# Patient Record
Sex: FEMALE | Race: WHITE
[De-identification: names, ages, dates, MRNs, and addresses within clinical notes are randomized per-mention and may not be internally consistent; named-entity substitution may affect disease eponyms.]

---

## 2021-12-07 ENCOUNTER — NURSE TRIAGE (OUTPATIENT)
Dept: OTHER | Facility: CLINIC | Age: 46
End: 2021-12-07

## 2021-12-07 NOTE — TELEPHONE ENCOUNTER
Patient called [facility] with red flag complaint to establish care with [facility] [place] [facility] [place]. Brief description of triage: Patient calls with complaints of lightheaded/dizziness when standing or changing position that started this morning. Triage indicates for patient to be seen in the office today. Advised caller if unable to get an appointment in the suggested time frame to go to an [facility] or walk-in clinic, caller agreeable. Care advice provided, patient verbalizes understanding; denies any other questions or concerns; instructed to call back for any new or worsening symptoms. Writer provided warm transfer to April at [facility] for appointment scheduling. Attention Provider: Thank you for allowing me to participate in the care of your patient. The patient was connected to triage in response to information provided to the [facility]. Please do not respond through this encounter as the response is not directed to a shared pool. Reason for Disposition   Vomiting occurs with dizziness    Answer Assessment - Initial Assessment Questions  1. DESCRIPTION: \"Describe your dizziness. \"      This morning got up and got dizzy/lightheaded and felt sick, vomited 2x     2. LIGHTHEADED: \"Do you feel lightheaded? \" (e.g., somewhat faint, woozy, weak upon standing)      Worse upon standing     3. VERTIGO: \"Do you feel like either you or the room is spinning or tilting? \" (i.e. vertigo)      Not if laying down, but does after getting up     4. SEVERITY: \"How bad is it? \"  \"Do you feel like you are going to faint? \" \"Can you stand and walk? \"    - MILD - walking normally    - MODERATE - interferes with normal activities (e.g., work, school)     - SEVERE - unable to stand, requires support to walk, feels like passing out now. Moderate     5. ONSET:  \"When did the dizziness begin? \"      This morning     6. AGGRAVATING FACTORS: \"Does anything make it worse? \" (e.g., standing, change in head position)      Standing, changing position    7. HEART RATE: \"Can you tell me your heart rate? \" \"How many beats in 15 seconds? \"  (Note: not all patients can do this)        Unsure- seems normal     8. CAUSE: \"What do you think is causing the dizziness? \"      Positive for coivd 24th or 25th     9. RECURRENT SYMPTOM: \"Have you had dizziness before? \" If so, ask: \"When was the last time? \" \"What happened that time? \"      Denies     10. OTHER SYMPTOMS: \"Do you have any other symptoms? \" (e.g., fever, chest pain, vomiting, diarrhea, bleeding)        Headache, vomiting    11. PREGNANCY: \"Is there any chance you are pregnant? \" \"When was your last menstrual period? \"        LMP-just finished couple weeks ago    Protocols used: [id number]